# Patient Record
Sex: FEMALE | Race: BLACK OR AFRICAN AMERICAN | NOT HISPANIC OR LATINO | URBAN - METROPOLITAN AREA
[De-identification: names, ages, dates, MRNs, and addresses within clinical notes are randomized per-mention and may not be internally consistent; named-entity substitution may affect disease eponyms.]

---

## 2017-09-23 ENCOUNTER — EMERGENCY (EMERGENCY)
Facility: HOSPITAL | Age: 22
LOS: 1 days | Discharge: HOME | End: 2017-09-23
Admitting: EMERGENCY MEDICINE
Payer: COMMERCIAL

## 2017-09-23 VITALS
RESPIRATION RATE: 16 BRPM | SYSTOLIC BLOOD PRESSURE: 121 MMHG | HEART RATE: 93 BPM | TEMPERATURE: 98 F | DIASTOLIC BLOOD PRESSURE: 85 MMHG | OXYGEN SATURATION: 100 % | WEIGHT: 119.93 LBS

## 2017-09-23 DIAGNOSIS — R51 HEADACHE: ICD-10-CM

## 2017-09-23 DIAGNOSIS — Z87.898 PERSONAL HISTORY OF OTHER SPECIFIED CONDITIONS: Chronic | ICD-10-CM

## 2017-09-23 DIAGNOSIS — B34.9 VIRAL INFECTION, UNSPECIFIED: ICD-10-CM

## 2017-09-23 DIAGNOSIS — R07.9 CHEST PAIN, UNSPECIFIED: ICD-10-CM

## 2017-09-23 LAB
ALBUMIN SERPL ELPH-MCNC: 3.9 G/DL — SIGNIFICANT CHANGE UP (ref 3.4–5)
ALP SERPL-CCNC: 72 U/L — SIGNIFICANT CHANGE UP (ref 40–120)
ALT FLD-CCNC: 18 U/L — SIGNIFICANT CHANGE UP (ref 12–42)
ANION GAP SERPL CALC-SCNC: 5 MMOL/L — LOW (ref 9–16)
AST SERPL-CCNC: 19 U/L — SIGNIFICANT CHANGE UP (ref 15–37)
BASOPHILS NFR BLD AUTO: 1 % — SIGNIFICANT CHANGE UP (ref 0–2)
BILIRUB SERPL-MCNC: 0.2 MG/DL — SIGNIFICANT CHANGE UP (ref 0.2–1.2)
BUN SERPL-MCNC: 13 MG/DL — SIGNIFICANT CHANGE UP (ref 7–23)
CALCIUM SERPL-MCNC: 9.6 MG/DL — SIGNIFICANT CHANGE UP (ref 8.5–10.5)
CHLORIDE SERPL-SCNC: 104 MMOL/L — SIGNIFICANT CHANGE UP (ref 96–108)
CO2 SERPL-SCNC: 29 MMOL/L — SIGNIFICANT CHANGE UP (ref 22–31)
CREAT SERPL-MCNC: 0.67 MG/DL — SIGNIFICANT CHANGE UP (ref 0.5–1.3)
EOSINOPHIL NFR BLD AUTO: 2.1 % — SIGNIFICANT CHANGE UP (ref 0–6)
GLUCOSE SERPL-MCNC: 102 MG/DL — HIGH (ref 70–99)
HCT VFR BLD CALC: 32.2 % — LOW (ref 34.5–45)
HGB BLD-MCNC: 10.3 G/DL — LOW (ref 11.5–15.5)
IMM GRANULOCYTES NFR BLD AUTO: 0.3 % — SIGNIFICANT CHANGE UP (ref 0–1.5)
LYMPHOCYTES # BLD AUTO: 52.4 % — HIGH (ref 13–44)
MCHC RBC-ENTMCNC: 25.2 PG — LOW (ref 27–34)
MCHC RBC-ENTMCNC: 32 G/DL — SIGNIFICANT CHANGE UP (ref 32–36)
MCV RBC AUTO: 78.7 FL — LOW (ref 80–100)
MONOCYTES NFR BLD AUTO: 9.9 % — SIGNIFICANT CHANGE UP (ref 2–14)
NEUTROPHILS NFR BLD AUTO: 34.3 % — LOW (ref 43–77)
PLATELET # BLD AUTO: 264 K/UL — SIGNIFICANT CHANGE UP (ref 150–400)
POTASSIUM SERPL-MCNC: 4 MMOL/L — SIGNIFICANT CHANGE UP (ref 3.5–5.3)
POTASSIUM SERPL-SCNC: 4 MMOL/L — SIGNIFICANT CHANGE UP (ref 3.5–5.3)
PROT SERPL-MCNC: 7.5 G/DL — SIGNIFICANT CHANGE UP (ref 6.4–8.2)
RBC # BLD: 4.09 M/UL — SIGNIFICANT CHANGE UP (ref 3.8–5.2)
RBC # FLD: 14.9 % — SIGNIFICANT CHANGE UP (ref 10.3–16.9)
SODIUM SERPL-SCNC: 138 MMOL/L — SIGNIFICANT CHANGE UP (ref 132–145)
TSH SERPL-MCNC: 1.11 UIU/ML — SIGNIFICANT CHANGE UP (ref 0.36–3.74)
WBC # BLD: 3.8 K/UL — SIGNIFICANT CHANGE UP (ref 3.8–10.5)
WBC # FLD AUTO: 3.8 K/UL — SIGNIFICANT CHANGE UP (ref 3.8–10.5)

## 2017-09-23 PROCEDURE — 70450 CT HEAD/BRAIN W/O DYE: CPT | Mod: 26

## 2017-09-23 PROCEDURE — 99285 EMERGENCY DEPT VISIT HI MDM: CPT | Mod: 25

## 2017-09-23 PROCEDURE — 93010 ELECTROCARDIOGRAM REPORT: CPT

## 2017-09-23 RX ORDER — SODIUM CHLORIDE 9 MG/ML
1000 INJECTION INTRAMUSCULAR; INTRAVENOUS; SUBCUTANEOUS ONCE
Qty: 0 | Refills: 0 | Status: COMPLETED | OUTPATIENT
Start: 2017-09-23 | End: 2017-09-23

## 2017-09-23 RX ORDER — ACETAMINOPHEN 500 MG
650 TABLET ORAL ONCE
Qty: 0 | Refills: 0 | Status: COMPLETED | OUTPATIENT
Start: 2017-09-23 | End: 2017-09-23

## 2017-09-23 RX ADMIN — Medication 650 MILLIGRAM(S): at 21:53

## 2017-09-23 RX ADMIN — SODIUM CHLORIDE 1000 MILLILITER(S): 9 INJECTION INTRAMUSCULAR; INTRAVENOUS; SUBCUTANEOUS at 21:54

## 2017-09-23 NOTE — ED PROVIDER NOTE - MEDICAL DECISION MAKING DETAILS
Labs, EKG and CT reviewed. Pt reports feeling much better after being given IVFs in ED. A&Ox3. NAD. Afebrile, nontoxic. Sitting comfortably. Will D/C with instructions to F/U with Dr. Chang this week. Supportive tx at home. Strict return precautions reviewed with pt in which pt verbalizes understanding and agrees to.

## 2017-09-23 NOTE — ED PROVIDER NOTE - OBJECTIVE STATEMENT
22 y/o F with PMH of Iron-deficiency Anemia and pituitary tumor (removed ~10 years ago) presents to ED with myriad of sx's x 2 days. Pt reports intermittent, nonspecific frontal headache, "brain fogginess", lightheadedness, body aches, subjective fever, chills, CP, abdominal pain, back pain, palpitations, nausea, diarrhea, increased thirst, fatigue, weakness and an overall sense of not feeling well. She took aleve this afternoon with partial relief of her headache. Otheriwse no other aggravating/alleviating factors.

## 2017-09-23 NOTE — ED ADULT TRIAGE NOTE - CHIEF COMPLAINT QUOTE
Pt reports headache, nausea, "abdominal discomfort," decreased appetite and photosensitivity. Pt reports symptoms have been worsening over the past week. States she felt warm and took tylenol approximately 2 hours ago. Pt did not take her temperature.

## 2017-09-24 VITALS
RESPIRATION RATE: 18 BRPM | TEMPERATURE: 98 F | HEART RATE: 62 BPM | OXYGEN SATURATION: 98 % | DIASTOLIC BLOOD PRESSURE: 65 MMHG | SYSTOLIC BLOOD PRESSURE: 108 MMHG

## 2017-09-24 LAB
APPEARANCE UR: CLEAR — SIGNIFICANT CHANGE UP
BILIRUB UR-MCNC: NEGATIVE — SIGNIFICANT CHANGE UP
COLOR SPEC: YELLOW — SIGNIFICANT CHANGE UP
DIFF PNL FLD: (no result)
GLUCOSE UR QL: NEGATIVE — SIGNIFICANT CHANGE UP
HCG UR QL: NEGATIVE — SIGNIFICANT CHANGE UP
KETONES UR-MCNC: NEGATIVE — SIGNIFICANT CHANGE UP
LEUKOCYTE ESTERASE UR-ACNC: NEGATIVE — SIGNIFICANT CHANGE UP
NITRITE UR-MCNC: NEGATIVE — SIGNIFICANT CHANGE UP
PH UR: 6 — SIGNIFICANT CHANGE UP (ref 5–8)
PROT UR-MCNC: NEGATIVE MG/DL — SIGNIFICANT CHANGE UP
SP GR SPEC: <=1.005 — SIGNIFICANT CHANGE UP (ref 1–1.03)
UROBILINOGEN FLD QL: 0.2 E.U./DL — SIGNIFICANT CHANGE UP

## 2017-09-24 RX ADMIN — SODIUM CHLORIDE 1000 MILLILITER(S): 9 INJECTION INTRAMUSCULAR; INTRAVENOUS; SUBCUTANEOUS at 00:33

## 2017-09-24 NOTE — ED ADULT NURSE NOTE - CHPI ED SYMPTOMS NEG
no loss of consciousness/no syncope/no blurred vision/no change in level of consciousness/no seizure

## 2024-04-02 PROBLEM — D49.7 NEOPLASM OF UNSPECIFIED BEHAVIOR OF ENDOCRINE GLANDS AND OTHER PARTS OF NERVOUS SYSTEM: Chronic | Status: ACTIVE | Noted: 2017-09-23

## 2024-04-02 PROBLEM — D64.9 ANEMIA, UNSPECIFIED: Chronic | Status: ACTIVE | Noted: 2017-09-23

## 2024-04-11 ENCOUNTER — APPOINTMENT (OUTPATIENT)
Dept: NEUROLOGY | Facility: CLINIC | Age: 29
End: 2024-04-11
Payer: MEDICAID

## 2024-04-11 DIAGNOSIS — S09.93XA UNSPECIFIED INJURY OF FACE, INITIAL ENCOUNTER: ICD-10-CM

## 2024-04-11 DIAGNOSIS — G50.1 ATYPICAL FACIAL PAIN: ICD-10-CM

## 2024-04-11 DIAGNOSIS — Z98.818 OTHER DENTAL PROCEDURE STATUS: ICD-10-CM

## 2024-04-11 DIAGNOSIS — D35.2 BENIGN NEOPLASM OF PITUITARY GLAND: ICD-10-CM

## 2024-04-11 PROBLEM — Z00.00 ENCOUNTER FOR PREVENTIVE HEALTH EXAMINATION: Status: ACTIVE | Noted: 2024-04-11

## 2024-04-11 PROCEDURE — 99204 OFFICE O/P NEW MOD 45 MIN: CPT

## 2024-04-11 NOTE — ASSESSMENT
[FreeTextEntry1] : 28-year-old woman with left sided maxillary pain following wisdom teeth extraction exacerbated by local applied pressure likely secondary to local irritation from the dental procedure.  She is currently well-managed following a short course of steroids prescribed by the oral maxillofacial surgeon and nonsteroidal anti-inflammatories but will consider neuropathic pain medication if needed. Overall impression is headache attributed to disorder of the teeth according to international headache Society classification ICHD-3. In support of this impression she has clinical evidence of a disorder involving 1 of more teeth temporally associated with an event  (i.e. molar extraction)  that caused worsening of the discomfort and exacerbation with palpation probing or pressure applied to the affected areas not accounted for by an alternate explanation. The most common causes of Headache attributed to disorder of the teeth are an endodontic or periodontal infection or abscess, or traumatic irritation such as pericoronitis around a partially-erupted lower wisdom tooth  Plan: Obtain CD of MRI when available for review Any further imaging can be considered after review of outside MRI Ideally would have scan including thin sections through the brainstem and FIESTA sequence Consider gabapentin. Deferred per patient at this time. Follow-up with dentist and OMS Follow-up with primary care and consider referral to a pain management specialist if needed.

## 2024-04-11 NOTE — HISTORY OF PRESENT ILLNESS
[Home] : at home, [unfilled] , at the time of the visit. [Medical Office: ([facility])___] : at the medical office located in  [Verbal consent obtained from patient] : the patient, [unfilled] [FreeTextEntry1] : 28-year-old woman referred for jaw pain.  She underwent molar extractions 3 weeks ago by an oral maxillofacial surgeon.  After the procedure she noticed sensitivity involving the left maxillary region.  It was described as a shooting pain radiating from the teeth backwards towards the throat and down to the back.  She has experienced some improvement following a short course of steroids and is now taking nonsteroidal anti-inflammatory that has made the discomfort manageable.  She notices when she applies pressure to the upper teeth she experiences this pain. 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

## 2024-09-05 ENCOUNTER — APPOINTMENT (OUTPATIENT)
Dept: NEUROLOGY | Facility: CLINIC | Age: 29
End: 2024-09-05
Payer: MEDICAID

## 2024-09-05 DIAGNOSIS — S09.93XA UNSPECIFIED INJURY OF FACE, INITIAL ENCOUNTER: ICD-10-CM

## 2024-09-05 DIAGNOSIS — E23.6 OTHER DISORDERS OF PITUITARY GLAND: ICD-10-CM

## 2024-09-05 DIAGNOSIS — G50.1 ATYPICAL FACIAL PAIN: ICD-10-CM

## 2024-09-05 DIAGNOSIS — Z98.818 OTHER DENTAL PROCEDURE STATUS: ICD-10-CM

## 2024-09-05 DIAGNOSIS — D35.2 BENIGN NEOPLASM OF PITUITARY GLAND: ICD-10-CM

## 2024-09-05 PROCEDURE — G2211 COMPLEX E/M VISIT ADD ON: CPT | Mod: NC,95

## 2024-09-05 PROCEDURE — 99215 OFFICE O/P EST HI 40 MIN: CPT | Mod: 95

## 2024-09-05 RX ORDER — GABAPENTIN 300 MG/1
300 CAPSULE ORAL 3 TIMES DAILY
Qty: 90 | Refills: 5 | Status: ACTIVE | COMMUNITY
Start: 2024-09-05 | End: 1900-01-01

## 2024-09-06 PROBLEM — E23.6 RATHKE'S CLEFT CYST: Status: ACTIVE | Noted: 2024-09-06

## 2024-09-06 NOTE — HISTORY OF PRESENT ILLNESS
[Home] : at home, [unfilled] , at the time of the visit. [Medical Office: ([facility])___] : at the medical office located in  [Verbal consent obtained from patient] : the patient, [unfilled] [FreeTextEntry1] : 28-year-old woman referred for jaw pain.  She underwent molar extractions 3 weeks ago by an oral maxillofacial surgeon.  After the procedure she noticed sensitivity involving the left maxillary region.  It was described as a shooting pain radiating from the teeth backwards towards the throat and down to the back.  She has experienced some improvement following a short course of steroids and is now taking nonsteroidal anti-inflammatory that has made the discomfort manageable.  She notices when she applies pressure to the upper teeth she experiences this pain. 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  September 5, 2024 Patient underwent an MRI recently [facility] that was reportedly stable/unrevealing pending independent review.  She dropped the CD off and it is being uploaded in radiology.  She continues to have similar symptoms of pain in the jaw similar to that previously described.  No changes in vision.  No facial numbness. Follows with her dentist/oral maxillofacial surgeon. Not currently on symptomatic management for the neuropathic pain.

## 2024-09-06 NOTE — ASSESSMENT
[FreeTextEntry1] : 28-year-old woman with left sided maxillary pain following wisdom teeth extraction exacerbated by local applied pressure likely secondary to local irritation from the dental procedure.  She is currently well-managed following a short course of steroids prescribed by the oral maxillofacial surgeon and nonsteroidal anti-inflammatories but will consider neuropathic pain medication if needed. Overall impression is headache attributed to disorder of the teeth according to international headache Society classification ICHD-3. In support of this impression she has clinical evidence of a disorder involving 1 of more teeth temporally associated with an event  (i.e. molar extraction)  that caused worsening of the discomfort and exacerbation with palpation probing or pressure applied to the affected areas not accounted for by an alternate explanation. The most common causes of Headache attributed to disorder of the teeth are an endodontic or periodontal infection or abscess, or traumatic irritation such as pericoronitis around a partially-erupted lower wisdom tooth. Repeat MRI reportedly unrevealing pending my independent review of the images and report.  Plan: Start Gabapentin 300 mg tabs.  Start with 1 tab at night and increase by 1 tab every week until at goal of 1 tab 3 times a day as tolerated.  Discussed risks and benefit and specifically that if she develops intolerable adverse effects of imbalance sedation she should go back to the previously tolerated dose or taper off entirely. Follow-up with dentist and OMS Follow-up with primary care Review CD when uploaded referral to a pain management specialist 3-month follow-up  ADDENDUM (9/6/2024): CDs uploaded to PACS.  Administration contacted [facility] to obtain report. 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 no enhancement of the trigeminal nerve microvascular compression.  Orbits unremarkable.

## 2024-09-06 NOTE — HISTORY OF PRESENT ILLNESS
[Home] : at home, [unfilled] , at the time of the visit. [Medical Office: ([facility])___] : at the medical office located in  [Verbal consent obtained from patient] : the patient, [unfilled] [FreeTextEntry1] : 28-year-old woman referred for jaw pain.  She underwent molar extractions 3 weeks ago by an oral maxillofacial surgeon.  After the procedure she noticed sensitivity involving the left maxillary region.  It was described as a shooting pain radiating from the teeth backwards towards the throat and down to the back.  She has experienced some improvement following a short course of steroids and is now taking nonsteroidal anti-inflammatory that has made the discomfort manageable.  She notices when she applies pressure to the upper teeth she experiences this pain. 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  September 5, 2024 Patient underwent an MRI recently [facility] that was reportedly stable/unrevealing pending independent review.  She dropped the CD off and it is being uploaded in radiology.  She continues to have similar symptoms of pain in the jaw similar to that previously described.  No changes in vision.  No facial numbness. Follows with her dentist/oral maxillofacial surgeon. Not currently on symptomatic management for the neuropathic pain.

## 2025-05-09 ENCOUNTER — EMERGENCY (EMERGENCY)
Facility: HOSPITAL | Age: 30
LOS: 1 days | End: 2025-05-09
Attending: EMERGENCY MEDICINE | Admitting: EMERGENCY MEDICINE
Payer: MEDICAID

## 2025-05-09 VITALS
TEMPERATURE: 98 F | OXYGEN SATURATION: 99 % | DIASTOLIC BLOOD PRESSURE: 76 MMHG | HEART RATE: 61 BPM | SYSTOLIC BLOOD PRESSURE: 113 MMHG | RESPIRATION RATE: 18 BRPM

## 2025-05-09 VITALS
HEART RATE: 67 BPM | TEMPERATURE: 98 F | OXYGEN SATURATION: 99 % | SYSTOLIC BLOOD PRESSURE: 98 MMHG | RESPIRATION RATE: 18 BRPM | DIASTOLIC BLOOD PRESSURE: 68 MMHG | WEIGHT: 169.98 LBS

## 2025-05-09 DIAGNOSIS — Z87.898 PERSONAL HISTORY OF OTHER SPECIFIED CONDITIONS: Chronic | ICD-10-CM

## 2025-05-09 PROCEDURE — 99282 EMERGENCY DEPT VISIT SF MDM: CPT

## 2025-05-09 PROCEDURE — 99284 EMERGENCY DEPT VISIT MOD MDM: CPT

## 2025-05-09 RX ORDER — MAGNESIUM, ALUMINUM HYDROXIDE 200-200 MG
30 TABLET,CHEWABLE ORAL ONCE
Refills: 0 | Status: COMPLETED | OUTPATIENT
Start: 2025-05-09 | End: 2025-05-09

## 2025-05-09 RX ORDER — LIDOCAINE HYDROCHLORIDE 20 MG/ML
10 JELLY TOPICAL ONCE
Refills: 0 | Status: COMPLETED | OUTPATIENT
Start: 2025-05-09 | End: 2025-05-09

## 2025-05-09 RX ADMIN — LIDOCAINE HYDROCHLORIDE 10 MILLILITER(S): 20 JELLY TOPICAL at 22:04

## 2025-05-09 RX ADMIN — Medication 30 MILLILITER(S): at 22:04

## 2025-05-09 NOTE — ED PROVIDER NOTE - OBJECTIVE STATEMENT
Pt w/ PMHx "undiagnosed chronic disease" p/w FB sensation in throat. She states she was eating Ramen, w/  boneless beef, and corn kernals, and she feels there is a piece of corn on the  base of her tongue. She states it is painful. No SOB. No dysphonia. Tolerating secretions.

## 2025-05-09 NOTE — ED ADULT NURSE NOTE - OBJECTIVE STATEMENT
Female aox4 c/o throat discomfort staring 30 minutes ago after eating dinner. Thinks corn is stuck in her throat. Denies sob, able to speak full sentences. Seen at urgent care and referred to ER.

## 2025-05-09 NOTE — ED PROVIDER NOTE - PHYSICAL EXAMINATION
Constitutional: Well appearing, awake, alert, oriented to person, place, time/situation and in no apparent distress.  ENMT: Airway patent. No erythema or exudates in oropharynx. No tongue / lip / uvula / pharyngeal / sublingual edema. No oral lesions. Uvula is midline. No drooling or stridor. Normal phonation. ? small adhered food to the base of L posterior tongue  Eyes: Clear bilaterally  Cardiac: Normal rate, regular rhythm.   Respiratory: Breathes easily. No increased WOB, tachypnea, hypoxia, or accessory mm use. Pt speaks in full sentences.   Neuro: Alert and oriented x 3, face symmetric and speech fluent. Nml gross motor movement, grossly non focal   Skin: Skin normal color for race, warm, dry and intact. No evidence of rash.  Psych: Alert and oriented to person, place, time/situation. normal mood and affect. no apparent risk to self or others.

## 2025-05-09 NOTE — ED ADULT TRIAGE NOTE - CHIEF COMPLAINT QUOTE
Pt presents in ER c/o throat pain and difficulty to swallow started 10 minutes ago. Pt denies fever, nausea or vomiting.

## 2025-05-09 NOTE — ED PROVIDER NOTE - CLINICAL SUMMARY MEDICAL DECISION MAKING FREE TEXT BOX
Food adhered to back of tongue, S/p Hurricane spray, wiping base of tongue w/ tongue depressor, and using suction, food no longer adhered and pt feeling better. GI cocktail given. Will d/c w/ f/u PCP.

## 2025-05-09 NOTE — ED PROVIDER NOTE - PATIENT PORTAL LINK FT
You can access the FollowMyHealth Patient Portal offered by [facility] by registering at the following website: [email or web address]. By joining [facility]’s FollowMyHealth portal, you will also be able to view your health information using other applications (apps) compatible with our system.

## 2025-05-09 NOTE — ED PROVIDER NOTE - NS ED ROS FT
Constitutional: No fever or chills.   ENMT: See HPI  Respiratory: No cough or respiratory distress. No hemoptysis. No history of asthma or RAD.  GI: No nausea, vomiting, diarrhea or abdominal pain.  Except as documented in the HPI, all other systems are negative.

## 2025-05-09 NOTE — ED PROVIDER NOTE - PROGRESS NOTE DETAILS
S/p Hurricane spray, wiping base of tongue w/ tongue depressor, and using suction, food no longer adhered and pt feeling better. Will d/c w/ f/u PCP.
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
## 2025-05-12 DIAGNOSIS — T17.228A FOOD IN PHARYNX CAUSING OTHER INJURY, INITIAL ENCOUNTER: ICD-10-CM

## 2025-06-02 ENCOUNTER — EMERGENCY (EMERGENCY)
Facility: HOSPITAL | Age: 30
LOS: 1 days | End: 2025-06-02
Attending: EMERGENCY MEDICINE | Admitting: EMERGENCY MEDICINE
Payer: MEDICAID

## 2025-06-02 VITALS
OXYGEN SATURATION: 100 % | DIASTOLIC BLOOD PRESSURE: 71 MMHG | HEART RATE: 75 BPM | TEMPERATURE: 98 F | SYSTOLIC BLOOD PRESSURE: 118 MMHG | RESPIRATION RATE: 19 BRPM

## 2025-06-02 VITALS
DIASTOLIC BLOOD PRESSURE: 86 MMHG | HEART RATE: 79 BPM | TEMPERATURE: 98 F | OXYGEN SATURATION: 97 % | WEIGHT: 149.91 LBS | SYSTOLIC BLOOD PRESSURE: 126 MMHG | HEIGHT: 62 IN | RESPIRATION RATE: 18 BRPM

## 2025-06-02 DIAGNOSIS — F39 UNSPECIFIED MOOD [AFFECTIVE] DISORDER: ICD-10-CM

## 2025-06-02 DIAGNOSIS — Z87.898 PERSONAL HISTORY OF OTHER SPECIFIED CONDITIONS: Chronic | ICD-10-CM

## 2025-06-02 PROCEDURE — 82962 GLUCOSE BLOOD TEST: CPT

## 2025-06-02 PROCEDURE — 90792 PSYCH DIAG EVAL W/MED SRVCS: CPT

## 2025-06-02 PROCEDURE — 99284 EMERGENCY DEPT VISIT MOD MDM: CPT

## 2025-06-02 NOTE — ED PROVIDER NOTE - CLINICAL SUMMARY MEDICAL DECISION MAKING FREE TEXT BOX
pt presents c/o feeling anxious - believes that is having haldol withdrawal (was just released from psych unit at another hosp), no si/hi/vh/ah, well appearing, hemodynamically stable, psych consulted - pt deemed safe dc - outpatient resources provided, pt and partner comfortable w/plan, strict return precautions given

## 2025-06-02 NOTE — ED BEHAVIORAL HEALTH ASSESSMENT NOTE - NSBHATTESTCOMMENTATTENDFT_PSY_A_CORE
Seen with ELVA.  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, hx of long covid, chronic fatigue, PPHx unclear psychiatric hx (personality disorder vs bipolar disorder), 3 prior psychiatric hospitalizations (recently at [facility] 5/23-5/29, involuntarily admitted due to presenting as psychotic vs delirious; 2 inpatient psychiatric hospitalizations in teenage years reportedly due to "panic attacks", no known prior suicide attempts, prior psychiatric trials of abilify (as a teen) and zyprexa (started at recent [facility] admission but discontinued before discharge), now presents to ED about 4 days after discharge from [facility], accompanied by her partner, stating she is feeling anxious, has multiple somatic symptoms, and worries she is in "haldol withdrawal".  Pt seen at bedside; interview conducted in private setting without partner in the room.  She presented as euthymic, linear, calm, somewhat circumstantial in terms of the topics she speaks about but is easily verbally redirected, easily interruptable and not irritable. She confirms psychiatric hx as above - noting hx of psychiatric hospitalizations as a teen due to experiencing panic attacks in foster placements. Her only adult psychiatric admission was recently at [facility] 5/23 - 5/29, where she initially presented due to "altered mental status" and vomiting, BIB family, who reported pt had been on a restricted diet of electrolyte water, protein shakes, and tumeric supplements. Pt treated in Ed and transferred to CPEP due to concern for psychosis; in CPEP pt described as highly disorganized and psychotic, family additionally reporting she was not sleeping and roaming the house at night.  On psychiatric unit pt was Rx zyprexa and depakote but eventually declined. She seemed to become less psychotic. Impression appeared to be less likely psychotic disorder or bipolar and more likely delirium vs adjustment disorder/personality disorder/trauma and somatic preoccupations.    On exam today, pt reports that since leaving [facility] she feels she is more sensitive to sounds and she feels "hyperstimulated". She is highly somatically preoccupied. She reports she thinks she may have autism spectrum disorder and that this is an explanation for many of her varied symptoms.   She denies prior suicide attempts but also says she can't fully remember if she may have had one as a teen.  She lives with her partner, is unemployed, enjoys going on walks with her dog. Her aunts live in [facility] and she stays with them often. Her mother lives in [place].    She reports she feels she has been hydrating and eating better. Endorses several peculiarities when it comes to PO intake - states she can't drink tap water, needs a special kind of filter, and in the past has become dehydrated after her special filter stopped working. She reports hydrating well since her [facility] discharge.  She reports she is "very hungry" and has been eating better; still drinks electrolyte water, only supplement she takes is vitamin C. She eats 3 meals a today, typically including protein yogurt, pasta, sandwiches. Is on a "low fodmap" diet.    Spoke with pt and partner who both feel safe with pt going home from ED. Discussed that given pt only received haldol as a low dose PRN during [facility] hospitalization, and several days have now passed, do not suspect her somatic sx are related to "haldol withdrawal", but recommend she continue follow up with primary care to manage these symptoms. She denies SI/HI/AVH.    She reports she has a PCP appt with Dr Kike Lim next week. She is still looking for a psychiatrist. Referral list provided to ED team.

## 2025-06-02 NOTE — ED BEHAVIORAL HEALTH ASSESSMENT NOTE - DESCRIPTION
PPHx of bipolar disorder and PMHx of pituitary adenoma, anemia, and chronic fatigue syndrome Pt completed highschool and attended college for a little for environmental science but stopped when the pandemic started. She reported that she was in some temporary placement homes throughout childhood. Her mother currently lives in [place] with all of her siblings. [place] and also occasionally sleeps at her aunts in [facility]. She has a dog that she walks in the park daily. No pertinent events. PPHx of unspecified psychotic disorder vs bipolar disorder and PMHx of pituitary adenoma, anemia, and chronic fatigue syndrome

## 2025-06-02 NOTE — ED PROVIDER NOTE - PATIENT PORTAL LINK FT
You can access the FollowMyHealth Patient Portal offered by [facility] by registering at the following website: [email or web address]. By joining [facility]’s FollowMyHealth portal, you will also be able to view your health information using other applications (apps) compatible with our system.

## 2025-06-02 NOTE — ED ADULT TRIAGE NOTE - CCCP TRG CHIEF CMPLNT
It was nice seeing you today. Your kidney function is stable. Please follow up with us in 6 months. I have ordered lab work for you to get done before then. In the meantime, please avoid NSAIDs and have a healthy diet and exercise.  Find out if you are on clonidine when you go home and let us know   psychiatric evaluation

## 2025-06-02 NOTE — ED ADULT NURSE NOTE - NSFALLUNIVINTERV_ED_ALL_ED
Bed/Stretcher in lowest position, wheels locked, appropriate side rails in place/Call bell, personal items and telephone in reach/Instruct patient to call for assistance before getting out of bed/chair/stretcher/Non-slip footwear applied when patient is off stretcher/[place] to call system/Physically safe environment - no spills, clutter or unnecessary equipment/Purposeful proactive rounding/Room/bathroom lighting operational, light cord in reach

## 2025-06-02 NOTE — ED PROVIDER NOTE - OBJECTIVE STATEMENT
The pt is a 30 y/o F, who presents to ED w/partner, c/o "haldol withdrawal" -- states that is feeling anxious, having insomnia and decreased appetite. Reports that was just involuntarily admitted to [facility] and was given haldol/ativan and zyprexa, then was released into family's custody. States that is feeling anxious. Denies si, hi, vh, ah, cp, sob, n/v/d, abd pain, drug or alcohol use.

## 2025-06-02 NOTE — ED BEHAVIORAL HEALTH ASSESSMENT NOTE - RISK ASSESSMENT
chronic risk is elevated due to hx of psychiatric hospitalizations, psychotic behavior, unemployment; acute risk is not elevated; pt denies SI/HI/AVH, is calm, linear, future oriented, not psychotic or in a major mood episode at this time. No known hx of suicidality. Is future oriented and help seeking.

## 2025-06-02 NOTE — ED BEHAVIORAL HEALTH ASSESSMENT NOTE - SUMMARY
28 y/o female with a PPHx of bipolar disorder and PMHx of pituitary adenoma, anemia, and chronic fatigue syndrome presents to the ED with increased anxiety x4 days after being released from inpatient psychiatry at [facility]. Pt states that they have been very overstimulated recently and having trouble eating foods without it upsetting their stomach. Due to recent admission to [facility] for a possible manic episode, psychiatry was consulted to access the patient. The pt presented anxious and had circumstantial thought process throughout the conversation. There is possible undiagnosed developmental delay present that contributes to the pt level of OCD and anxiety. Further workup of this should be done outpatient. Overall pt is not in an acute manic episode or have SI/HI and is safe for discharge from a psychiatric standpoint.

## 2025-06-02 NOTE — ED ADULT NURSE NOTE - CHIEF COMPLAINT QUOTE
Pt presents to ED C/O worsening shivering, cold sweats, brain fog S/P D/C from psych unit at [facility] on Thursday as per Pt. Pt states, " They gave me haldol while I was there I'm worried I could be withdrawing from that". Pt denies SI, HI, hallucinations, alcohol/drug use. Reports increased anxiety since being discharged. States, " They didn't diagnose me with anything they discharged me with famotidine for my appetite". Hx chronic fatigue. BG performed.  Partner at bedside.

## 2025-06-02 NOTE — ED BEHAVIORAL HEALTH ASSESSMENT NOTE - CURRENT MEDICATION
None (briefly trialed on zyprexa and depakote during recent [facility] admission, but they were discontinued per pt request days before discharge)

## 2025-06-02 NOTE — ED BEHAVIORAL HEALTH ASSESSMENT NOTE - NSSUICPROTFACT_PSY_ALL_CORE
Identifies reasons for living/Supportive social network of family or friends/Positive therapeutic relationships/Ability to cope with stress/Beloved pets

## 2025-06-02 NOTE — ED BEHAVIORAL HEALTH ASSESSMENT NOTE - NS ED BHA PLAN TR BH CONTACTED FT
attempted but they are out of office. Necessary collateral from recent [facility] admission accessed through [facility]

## 2025-06-02 NOTE — ED ADULT NURSE NOTE - OBJECTIVE STATEMENT
Pt A&Ox3 and able to speak in complete sentences. Pt c/o weakness, increased anxiety, decrease appetite. Pt endorsed that she was recently discharged form [facility] for "behavioral" issues that she reportedly does not remember. Pt endorsed receiving haldol during admission. Pt endorsed "I think im withdrawing from the haldol. The famotidine helps but I am still having this anxiety.". Pt denies SI/HI/AH/VH. Endorsed hx chronic.

## 2025-06-02 NOTE — ED BEHAVIORAL HEALTH ASSESSMENT NOTE - SUICIDALITY
A couple ideas:    First, I would try switching to allegra as it is usually felt to be as effective as zyrtec and sometimes people aren't as responsive to zyrtec as they previously had been.  There is not really a prescription antihistamine available, but we get our cetirizine at [facility] and it like <$30 for a year (the generic)  I'll send in an rx for Rhinocort and let's try that and see how she does  I can send rx for zyrtec but i'm not sure pharmacy will fill as it is otc and also not sure if cheaper    If she is not responding satisfactorily, it may be worth considering allergy evaluation to delineate what she is allergic to and how to best manage/prevent symptoms       No

## 2025-06-02 NOTE — ED ADULT TRIAGE NOTE - CHIEF COMPLAINT QUOTE
Pt presents to ED C/O worsening shivering, cold sweats, brain fog S/P D/C from psych unit at [facility] on Thursday as per Pt. Pt states, " They gave me haldol while I was there I'm worried I could be withdrawing from that". Pt denies SI, HI, hallucinations, alcohol/drug use. Reports increased anxiety since being discharged. States, " They didn't diagnose me with anything they discharged me with famotidine for my appetite". Hx chronic fatigue. BG performed.  Partner at bedside.

## 2025-06-02 NOTE — ED BEHAVIORAL HEALTH ASSESSMENT NOTE - DETAILS
Pt was admitted to [facility] for possible manic episode see hpi Pt was admitted to [facility] for possible manic episode (5/23 - 5/29) not indicated discharge summary and inpatient records are directly available through [facility]. Discharge psychiatrist (Ashley Doherty) attempted to contact but she is out of office at this time. self referred

## 2025-06-02 NOTE — ED PROVIDER NOTE - ATTENDING APP SHARED VISIT CONTRIBUTION OF CARE
The pt is a 30 y/o F, who presents to ED w/partner, c/o "haldol withdrawal" -- states that is feeling anxious, having insomnia and decreased appetite. Reports that was just involuntarily admitted to [facility] and was given haldol/ativan and zyprexa, then was released into family's custody. States that is feeling anxious. Denies si, hi, vh, ah, cp, sob, n/v/d, abd pain, drug or alcohol use.    pt presents c/o feeling anxious - believes that is having haldol withdrawal (was just released from psych unit at another hosp), no si/hi/vh/ah, well appearing, hemodynamically stable, psych consulted - pt deemed safe dc - outpatient resources provided, pt and partner comfortable w/plan, strict return precautions given    Addendum to attending statement: I have reviewed the ACP note and agree with the history, exam, and plan of care. I  was available to PA   as a supervising provider if needed. PA given opportunity to ask questions and request further evaluation / care.

## 2025-06-02 NOTE — ED BEHAVIORAL HEALTH ASSESSMENT NOTE - HPI (INCLUDE ILLNESS QUALITY, SEVERITY, DURATION, TIMING, CONTEXT, MODIFYING FACTORS, ASSOCIATED SIGNS AND SYMPTOMS)
30 y/o female with a PPHx of bipolar disorder and PMHx of pituitary adenoma, anemia, and chronic fatigue syndrome presents to the ED with increased anxiety x4 days after being released from inpatient psychiatry at [facility]. Pt states that they have been very overstimulated recently and having trouble eating foods without it upsetting their stomach. Due to recent admission to [facility] for a possible manic episode, psychiatry was consulted to access the patient.  In the interview, the patient was calming sitting in a chair in the ED and was cooperative throughout the interview. Pt has come circumstantial thought process but the patient was oriented to person, time, place, and situation. The patient explained that they remember being admitted to [facility] but stated that it was “not very nice there”. The patient endorsed some anxiety that has been there since childhood but feels that it has been worse the last few days. She also stated that she feels like she has been “withdrawing from Haldol” as she feels much more easily over-stimulated than normal. Pt stated that she does not normally take medications including “pain pills” so she feels like something is off because of all the medications she was on during her recent admission at [facility]. Pt also reported that she feels that she has a low tolerance for medications due to her history of the podiatry adenoma. Denies drinking EtOH, smoking, or illicit drug use. Denies HI/SI/AVH.  Per the patient's partner, they have no concerns for the patient's safety. He stated that she does seem more anxious than normal. He was also reassured that the pt has a good support system and that he is helping make all the follow up appointments for the pt.   Pt completed high school and attended college for a little for environmental science but stopped when the pandemic started. She reported that she was in some temporary placement homes throughout childhood. Her mother currently lives in [place] with all of her siblings. [place] at her aunts [place]. She has a dog that she walks in the park daily. 30 y/o female with unclear psychiatric hx (personality disorder vs bipolar disorder), 3 prior psychiatric hospitalizations (recently at [facility] 5/23-5/29, involuntarily admitted due to presenting as psychotic vs delirious; 2 inpatient psychiatric hospitalizations in teenage years reportedly due to "panic attacks", no known prior suicide attempts, prior psychiatric trials of abilify (as a teen) and zyprexa (started at recent [facility] admission but discontinued before discharge), and PMHx of pituitary adenoma, anemia, and chronic fatigue syndrome presents to the ED with increased anxiety x4 days after being released from inpatient psychiatry at [facility]. Pt states that she has been very overstimulated recently and having trouble eating foods without it upsetting their stomach. Due to recent admission to [facility] for a unspecified psychotic disorder, psychiatry was consulted to assess the patient.  In the interview, the patient was calming sitting in a chair in the ED and was cooperative throughout the interview. Pt has come circumstantial thought process but the patient was oriented to person, time, place, and situation. The patient explained that they remember being admitted to [facility] but stated that it was “not very nice there”. The patient endorsed some anxiety that has been there since childhood but feels that it has been worse the last few days. She also stated that she feels like she has been “withdrawing from Haldol” as she feels much more easily over-stimulated than normal. Pt stated that she does not normally take medications including “pain pills” so she feels like something is off because of all the medications she was on during her recent admission at [facility]. Pt also reported that she feels that she has a low tolerance for medications due to her history of the pituitary adenoma. Denies drinking EtOH, smoking, or illicit drug use. Denies HI/SI/AVH.  Per the patient's partner, they have no concerns for the patient's safety. He stated that she does seem more anxious than normal. He was also reassured that the pt has a good support system and that he is helping make all the follow up appointments for the pt.   Pt completed high school and attended college for a little for environmental science but stopped when the pandemic started. She reported that she was in some temporary placement homes throughout childhood but ultimately returned to the custody of her biological mother. Her mother currently lives in [place] with all of her siblings. She currently lives with her partner on the [facility] and also occasionally sleeps at her aunts in [facility]. She has a dog that she walks in the park daily.  She presents as calm, euthymic, cooperative, and linear; no current signs of psychosis, depression, or becca. Pt responded well to reassurance about her symptoms and is agreeable with discharge home. She has a PCP visit next week and she was encouraged to speak to him about her plethora of physical/somatic symptoms.  Psychiatrically, her partner is helping her identify a psychiatrist and they have 2 possible leads and are trying to make the appointment.

## 2025-06-02 NOTE — ED BEHAVIORAL HEALTH ASSESSMENT NOTE - NSCURPASTPSYDX_PSY_ALL_CORE
Mood disorder previous psychotic sx prompting recent [facility] admission; not currently appearing psychotic/Mood disorder/Psychotic disorder/PTSD/Cluster B Personality disorder/traits

## 2025-06-02 NOTE — ED BEHAVIORAL HEALTH ASSESSMENT NOTE - OTHER PAST PSYCHIATRIC HISTORY (INCLUDE DETAILS REGARDING ONSET, COURSE OF ILLNESS, INPATIENT/OUTPATIENT TREATMENT)
Psychiatric admission to [facility] 5/2025 for possible manic episode, 2 other psychiatric admission as a child but pt is unable to recall where Psychiatric admission to [facility] 5/23 - 5/29/2025 for psychotic behavior; per discharge summary, overall impression was for delirium (pt had very limited PO intake and potential dehydration prior to admission) versus personality disorder. She was trialed on zyprexa and depakote but they were ultimately discontinued at pt's request. By end of admission pt appeared nonpsychotic,     Pt reports at least 2 other psychiatric admission as a teenager but pt is unable to recall where. States the reason was due to "panic attacks" when she was in a temporary housing placement/foster care. Remembers being trialed on abilify.    Denies any prior self harm attempts or suicidality.    Reports she has seen a therapist in the past; last time was around 2021.    Not currently on any psychiatric medications.    Is connected to a PCP and currently attempting to pursue outpatient psychiatric care.

## 2025-06-04 DIAGNOSIS — G47.00 INSOMNIA, UNSPECIFIED: ICD-10-CM

## 2025-06-04 DIAGNOSIS — Z56.0 UNEMPLOYMENT, UNSPECIFIED: ICD-10-CM

## 2025-06-04 DIAGNOSIS — F39 UNSPECIFIED MOOD [AFFECTIVE] DISORDER: ICD-10-CM

## 2025-06-04 DIAGNOSIS — G93.32 MYALGIC ENCEPHALOMYELITIS/CHRONIC FATIGUE SYNDROME: ICD-10-CM

## 2025-06-04 DIAGNOSIS — F60.89 OTHER SPECIFIC PERSONALITY DISORDERS: ICD-10-CM

## 2025-06-04 DIAGNOSIS — R63.0 ANOREXIA: ICD-10-CM

## 2025-06-04 SDOH — ECONOMIC STABILITY - INCOME SECURITY: UNEMPLOYMENT, UNSPECIFIED: Z56.0

## 2025-09-01 ENCOUNTER — TRANSCRIPTION ENCOUNTER (OUTPATIENT)
Age: 30
End: 2025-09-01